# Patient Record
Sex: MALE | Race: WHITE | NOT HISPANIC OR LATINO | URBAN - METROPOLITAN AREA
[De-identification: names, ages, dates, MRNs, and addresses within clinical notes are randomized per-mention and may not be internally consistent; named-entity substitution may affect disease eponyms.]

---

## 2017-11-05 ENCOUNTER — EMERGENCY (EMERGENCY)
Facility: HOSPITAL | Age: 22
LOS: 1 days | Discharge: HOME | End: 2017-11-05
Attending: EMERGENCY MEDICINE | Admitting: EMERGENCY MEDICINE
Payer: SELF-PAY

## 2017-11-05 VITALS
RESPIRATION RATE: 16 BRPM | TEMPERATURE: 97 F | OXYGEN SATURATION: 96 % | HEART RATE: 96 BPM | SYSTOLIC BLOOD PRESSURE: 107 MMHG | DIASTOLIC BLOOD PRESSURE: 69 MMHG

## 2017-11-05 PROCEDURE — 99283 EMERGENCY DEPT VISIT LOW MDM: CPT | Mod: 25

## 2017-11-05 PROCEDURE — 12011 RPR F/E/E/N/L/M 2.5 CM/<: CPT

## 2017-11-05 PROCEDURE — 99053 MED SERV 10PM-8AM 24 HR FAC: CPT

## 2017-11-05 RX ORDER — TETANUS TOXOID, REDUCED DIPHTHERIA TOXOID AND ACELLULAR PERTUSSIS VACCINE, ADSORBED 5; 2.5; 8; 8; 2.5 [IU]/.5ML; [IU]/.5ML; UG/.5ML; UG/.5ML; UG/.5ML
0.5 SUSPENSION INTRAMUSCULAR ONCE
Qty: 0 | Refills: 0 | Status: DISCONTINUED | OUTPATIENT
Start: 2017-11-05 | End: 2017-11-11
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
## 2017-11-10 DIAGNOSIS — S01.112A LACERATION WITHOUT FOREIGN BODY OF LEFT EYELID AND PERIOCULAR AREA, INITIAL ENCOUNTER: ICD-10-CM
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
## 2018-08-13 NOTE — ED PROCEDURE NOTE - NS ED PROCEDURE ASSISTED BY
ED Attending  CC: Sona         Department of Emergency Medicine   ED  Provider Note  Admit Date/RoomTime: 8/12/2018  8:26 PM  ED Room: 13/13  Chief Complaint:   Foreign Body in Eye (left eye, possible piece of wood)    History of Present Illness   Source of history provided by:  patient. History/Exam Limitations: none. Shyann Monroe is a 54 y.o. old male presenting to the emergency department by private vehicle, with sudden onset foreign body sensation, itching, pain and tearing to left eye, which began around 1730 last night prior to arrival.  Since onset his symptoms have been persistent and moderate in severity. Associated signs & symptoms of:  none. The patients tetanus status is more than 5 years ago. Patient states that he was cutting wood without safety glasses when he felt a foreign body hit his left eye. Since then he has had pain, tearing, and an FB sensation. Does not wear glasses or contacts. Patient denies fever,Chills night sweats and shortness of breath chest pain abdominal pain nausea or vomiting. Patient states that he has been rubbing his eye for the last day before coming in and that it is very red. Patient denies any visual changes with this. Mechanism:     [x]  FB Exposure     []  Chemical Exposure     []  Direct Trauma     []  High Speed Machinery Use     []  Welding      Circumstances:    []  Contact Lens Use     []  Recent URI Sx's     []  Spontaneous Onset     []  Close Contact w/similar Sx's     []  Work Related     History of:     []   Glaucoma     []   Recent Eye Surgery     ROS    Pertinent positives and negatives are stated within HPI, all other systems reviewed and are negative. Past Surgical History:  has no past surgical history on file. Social History:  reports that he has never smoked. He does not have any smokeless tobacco history on file. He reports that he drinks alcohol. He reports that he does not use drugs.   Family History: family history is not on Radiologist unless otherwise noted. No orders to display       ED Course / Medical Decision Making     Medications   tetracaine (TETRAVISC) 0.5 % ophthalmic solution 2 drop (2 drops Ophthalmic Given 8/12/18 2126)   fluorescein ophthalmic strip 1 each (1 mg Left Eye Given 8/12/18 2122)   Tetanus-Diphth-Acell Pertussis (BOOSTRIX) injection 0.5 mL (0.5 mLs Intramuscular Given 8/12/18 2123)        Re-examination:  8/12/18           Patient was informed that we were unable to remove the foreign body with multiple attempts. Patient did have a vagal response while attempting to remove foreign body. Patient was given water and had vital signs rechecked. Patient was fine after this event. Patient is ambulatory in the emergency department with a stable gait. Patient was [facility] whose information was provided calling them in the morning to be seen in their office. He will be given naproxen for pain and erythromycin to prevent infection. He was instructed to return to the emergency department for any new or worsening symptoms prior to his ability to get into the [facility]. Patient is agreeable to this plan. Consult(s):   None    Procedure(s):     WOOD'S LAMP EXAM:  Performed By: LOBITO Bangura CNP and Dr. Yolanda Oropeza    Left Eye: Cornea: no corneal ulcer was observed, no limbal injection was noted and an abrasion is present which is approximately 1 mm at the 7 o'clock position. Patient has a less th 1 mm likely wood particle noted in the conjunctiva at the 7:00 aspect in the center of the abrasion that was unable to be removed with the above measures noted. Flourescein stain: Positive. Anterior chamber: no abnormalities were observed. MDM:   Patient presents to the emergency department today for a likely foreign body to the left eye that occurred after working with wood yesterday. Dr. Guerrero See and  myself at bedside for evaluation of this.  Patient does have The procedure was performed independently

## 2025-02-17 PROBLEM — Z00.00 ENCOUNTER FOR PREVENTIVE HEALTH EXAMINATION: Status: ACTIVE | Noted: 2025-02-17

## 2025-02-19 ENCOUNTER — APPOINTMENT (OUTPATIENT)
Dept: NEUROSURGERY | Facility: CLINIC | Age: 30
End: 2025-02-19

## 2025-02-19 DIAGNOSIS — Q28.8 OTHER SPECIFIED CONGENITAL MALFORMATIONS OF CIRCULATORY SYSTEM: ICD-10-CM

## 2025-02-19 PROBLEM — Z78.9 NO PERTINENT PAST MEDICAL HISTORY: Status: RESOLVED | Noted: 2025-02-19 | Resolved: 2025-02-19

## 2025-02-19 PROBLEM — Z78.9 SOCIAL ALCOHOL USE: Status: ACTIVE | Noted: 2025-02-19

## 2025-02-19 PROCEDURE — 99202 OFFICE O/P NEW SF 15 MIN: CPT | Mod: 93

## 2025-02-19 RX ORDER — FINASTERIDE 1 MG/1
TABLET ORAL
Refills: 0 | Status: ACTIVE | COMMUNITY

## 2025-02-20 ENCOUNTER — APPOINTMENT (OUTPATIENT)
Dept: NEUROSURGERY | Facility: CLINIC | Age: 30
End: 2025-02-20

## 2025-02-20 DIAGNOSIS — Z78.9 OTHER SPECIFIED HEALTH STATUS: ICD-10-CM

## 2025-05-12 NOTE — ED PROVIDER NOTE - PSH
Date of service: 5/13/25    REASON FOR VISIT  1. Prostate cancer  (CMD)         HISTORY OF PRESENT ILLNESS    The patient is a 77 year old male presents for follow up on elevated PSA of 139 from 1.79 and was seen by urologist  on 2/17/25. Repeat PSA was 160.48 on 2/17/25  Patient I planned to get prostate biopsy on 3/19/25.  Patient is getting bone scan on 3/14/25.  Patient has upper and lower extremity pain for 4 months and also lost 50 lb weight with poor appetite.   Currently on [place] [facility].    Had biopsy of prostate on 3/19/25.   Final Diagnosis    1. Prostate, right, core needle biopsy:  -- Prostatic adenocarcinoma, grade group 5 (Isreal score 5 + 4 = 9), involving 8 of 8 cores (approximately 90% of submitted tissue).  -- Percentage of pattern 4: 5-10%  -- Cribriform glands present.  -- Percentage of pattern 5: %  -- Perineural invasion present.     2. Prostate, left, core needle biopsy:  -- Prostatic adenocarcinoma, grade group 5 (Isreal score 5 + 5 = 10), involving 2 of 6 cores (approximately 5% of submitted tissue).  -- Percentage of pattern 5: %  -- Perineural invasion present.  -- Lymphovascular invasion present.     NGS Foundation Once HRD positive, MS stable, TMB-low     IMAGING:    CT HEAD 3/21/25  IMPRESSION:     1.  Findings consistent with osseous metastases involving the right greater  than left mandibular necks, without pathologic fracture.  2.  No intracranial metastases.    CT-CAP  3/21/25    IMPRESSION:      Extensive osseous metastatic disease throughout the osseous structures of  the chest, abdomen, and pelvis. Subacute right lateral rib fractures may be  pathological in nature.     Multiple abnormally enlarged mediastinal, left axillary, and abdominopelvic  lymph nodes, also concerning for metastatic disease.     Mild prostatomegaly with a 3.3 x 2.9 cm ill-defined area of hypoattenuation  which may represent patient's known primary malignancy with  possible  extension/involvement of the left seminal vesicle, not well assessed on the  current modality.     Mild bilateral adrenal gland thickening/nodularity, left greater than  right. Attention on follow-up imaging.    TREATMENT:    Bicalutamide 50mg po daily 3/25/25 to 4/27/25.  Lupron 45mg started on 4/8/25  Abiraterone 1000mg +Prednisone 5mg po daily  started 4/28/25  Palliative RT to skeletal mets s/p radiation to bilateral femurs with 20 Gy in 5 fractions between 04/28/25-05/02/25.         20 Dozier in 5 fractions to bilateral humerus     INTERVAL HISTORY:    Feels better after starting ADT. No new complaints. Performance status has improved to ECOG 1.    Allergies as of 05/13/2025    (No Known Allergies)     Current Outpatient Medications   Medication Sig Dispense Refill    docusate sodium-sennosides (SENOKOT S) 50-8.6 MG per tablet Take 1 tablet by mouth 2 times daily as needed for Constipation. 60 tablet 3    polyethylene glycol (MIRALAX) 17 GM/SCOOP powder Take 17 g by mouth daily as needed (constipation). Stir and dissolve powder in any 4 to 8 ounces of beverage, then drink. 510 g 3    abiraterone acetate (ZYTIGA) 250 MG Tab Take 4 tablets by mouth daily. Begin taking on April 28, 2025. 120 tablet 5    predniSONE (DELTASONE) 5 MG tablet Take 1 tablet by mouth daily. Begin taking on April 28, 2025. 30 tablet 5    dexAMETHasone (DECADRON) 4 MG tablet Take 1 tablet by mouth 2 times daily (with meals). (Patient not taking: Reported on 5/13/2025) 10 tablet 0    bicalutamide (CASODEX) 50 MG tablet Take 1 tablet by mouth daily. (Patient not taking: Reported on 5/13/2025) 30 tablet 1    HYDROcodone-acetaminophen (NORCO) 5-325 MG per tablet Take 1-2 tablets by mouth every 6 hours as needed for Pain. (Patient not taking: Reported on 5/13/2025)      empagliflozin (Jardiance) 10 MG tablet Take 1 tablet by mouth daily (before breakfast). 90 tablet 0    metoPROLOL succinate (Toprol XL) 25 MG 24 hr tablet Take 0.5  tablets by mouth daily. 45 tablet 0    sacubitril-valsartan (Entresto) 24-26 MG per tablet Take 1 tablet by mouth in the morning and 1 tablet in the evening. 180 tablet 0    ticagrelor (BRILINTA) 60 MG tablet Take 1 tablet by mouth in the morning and 1 tablet in the evening. 180 tablet 0    nitroGLYCERIN (NITROSTAT) 0.4 MG sublingual tablet 1 tablet under the tongue every 5 minutes for chest pain, if not resolved after 3 tablets call 911.. (Patient not taking: Reported on 4/8/2025) 25 tablet 0    atorvastatin (LIPITOR) 40 MG tablet Take 1 tablet by mouth daily. (Patient not taking: Reported on 5/13/2025) 90 tablet 1    acetaminophen (TYLENOL) 500 MG tablet Take 1,000 mg by mouth every 4 hours as needed for Pain. (Patient not taking: Reported on 5/13/2025)      fluticasone (FLONASE) 50 MCG/ACT nasal spray Spray 1 spray in each nostril daily. (Patient not taking: Reported on 5/13/2025) 16 g 0    CARBOXYMethylcellulose PF (REFRESH CELLUVISC) 1 % Gel ophthalmic gel Place 1 drop into both eyes 2 times daily as needed for Dry Eyes.      aspirin 81 MG EC tablet Take 1 tablet by mouth daily. (Patient not taking: Reported on 5/7/2025) 30 tablet 2    finasteride (PROSCAR) 5 MG tablet Take 5 mg by mouth daily.      tamsulosin (FLOMAX) 0.4 MG Cap Take 0.4 mg by mouth daily.       No current facility-administered medications for this visit.        PMS HISTORY    LBBB (left bundle branch block)                               Sleep apnea                                                   Myocardial infarction  (CMD)                                  Coronary artery disease                                       BPH (benign prostatic hyperplasia)                            Gastroesophageal reflux disease                                CORONARY STENT PLACEMENT                                      HERNIA REPAIR                                                 QUADRICEPS REPAIR                                            Social History      Tobacco Use    Smoking status: Former     Current packs/day: 0.00     Types: Cigarettes     Start date: 1960     Quit date: 1990     Years since quittin.0    Smokeless tobacco: Never   Substance Use Topics    Alcohol use: Never    Drug use: Not Currently     History reviewed. No pertinent family history.    REVIEW OF SYSTEMS  Alfonso Marie LPN  2025 11:14 AM  Signed  Ej Orantes is a 77 year old male here for  Chief Complaint   Patient presents with    Follow-up     Prostate cancer (CMD)         Denies latex allergy or sensitivity.    Medication verified and med list updated.  PCP and Pharmacy verified.    Social History     Tobacco Use   Smoking Status Former    Current packs/day: 0.00    Types: Cigarettes    Start date: 1960    Quit date: 1990    Years since quittin.0   Smokeless Tobacco Never     Advance Directives Filed: No    ECOG:   ECOG   ECOG Performance Status        Vitals:    There were no vitals taken for this visit.    These vital signs are:  Within defined parameters (Per Reference \"Defined Limits Hospital Outpatient Department (HOD)\")    Height: Yes, shoes off.  Ht Readings from Last 1 Encounters:   25 5' 2\" (1.575 m)     Weight:Yes, shoes off.  Wt Readings from Last 3 Encounters:   25 82.1 kg (181 lb)   25 78.7 kg (173 lb 6.4 oz)   04/10/25 75.3 kg (165 lb 14.3 oz)       BMI: There is no height or weight on file to calculate BMI.    REVIEW OF SYSTEMS  GENERAL:  Patient denies headache, fevers, chills, excessive fatigue, change in appetite, weight loss, dizziness, but complains of: night sweats  ALLERGIC/IMMUNOLOGIC: Verified allergies: Yes  EYES:  Patient denies significant visual difficulties, double vision, blurred vision  ENT/MOUTH: Patient denies problems with hearing, sore throat, sinus drainage, mouth sores  ENDOCRINE:  Patient denies diabetes, thyroid disease, hormone replacement, hot flashes  HEMATOLOGIC/LYMPHATIC: Patient denies  No significant past surgical history bleeding, tender lymph nodes, swollen lymph nodes, but complains of: easy bruising  BREASTS: Patient denies abnormal masses of breast, nipple discharge, pain  RESPIRATORY:  Patient denies lung pain with breathing, cough, coughing up blood, but complains of: shortness of breath  CARDIOVASCULAR:  Patient denies anginal chest pain, palpitations, shortness of breath when lying flat, peripheral edema  GASTROINTESTINAL: Patient denies abdominal pain , nausea, vomiting, diarrhea, GI bleeding, constipation, change in bowel habits, heartburn, sensation of feeling full, difficulty swallowing  : Patient denies blood in the urine, burning with urination, frequency, urgency, hesitancy, incontinence  MUSCULOSKELETAL:  Patient denies joint pain, bone pain, joint swelling, redness, decreased range of motion  SKIN:  Patient denies chronic rashes, inflammation, ulcerations, skin changes, itching  NEUROLOGIC:  Patient denies loss of balance, areas of focal weakness, abnormal gait, sensory problems, numbness, tingling  PSYCHIATRIC: Patient denies insomnia, depression, anxiety    This patient reported abnormal symptoms that needed immediate verbal communication: No         PHYSICAL EXAM  General: The patient is a  77 year old male who is alert, oriented times 3, in no apparent distress.  Vitals: :Visit Vitals  /70   Pulse (!) 54   Temp 98.1 °F (36.7 °C)   Resp 17   Wt 83.7 kg (184 lb 9.6 oz)   SpO2 99%   BMI 33.76 kg/m²     ECOG 1  HEENT:  Pupils equally round, reactive to light with extraocular movements intact.  Anicteric sclerae with no oral lesions.  Neck:  Supple with no cervical or supraclavicular lymphadenopathy.  No jugular venous distention or carotid bruit.  There is no thyromegaly.   Lungs:  Clear to auscultation bilaterally.  There is no evidence of wheezing or rales on auscultation.   Cardiovascular:  Regular rate and rhythm.    Back:  There is no reproducible spinal tenderness.  Abdomen:  Soft, nontender, no  hepatosplenomegaly.  Bowel sounds are normal.     Extremities: No cyanosis, clubbing or edema.  Lymphatics: There is no cervical, supraclavicular, axillary lymphadenopathy.    Skin: No bruises or petechiae seen.  Neurologic: no focal deficits. 5/5 muscle strength in all 4 extremities.     LABS  Lab Results   Component Value Date    WBC 6.4 04/08/2025    HGB 11.6 (L) 04/08/2025    HCT 37.3 (L) 04/08/2025     04/08/2025    GLUCOSE 97 04/08/2025    CREATININE 0.91 04/08/2025    GPT 30 04/08/2025        DIAGNOSTIC STUDIES  CARDIAC DEVICE CHECK - IN CLINIC    Result Date: 2/27/2025  Table formatting from the original result was not included. Comments: The device was programmed to check thresholds and assess underlying rhythm. Interpretation: The physician personally reviewed and agree with the device interrogation as documented. Implants   ICD  Defibrillator Hebron Heart Failure Bi-V Icd - [id number] - Implanted   (Left) Chest  Inventory item: DFBR GALLANT HF DF4 IS-4 IS-1 CNCT [id number] 34CC 76GM Model/Cat number: [id number]  Serial number: [id number] : [employer] Cardiac Arrhythmias  Device identifier: [id number] Device identifier type: GS1  Implant Tracking Number: [id number] Pocket Location: Submuscular  Chamber(s) Paced: RA/RV/LV Laterality: Left  Site: Chest Implanted by: Bhargav Zuniga MD  Time of implant: 11:20 AM Date of implant: 7/12/2022  GUDID Information   Request status Successful    Brand name: Hebron™ Version/Model: [id number]  Company name: [employer] MRI safety info as of 7/12/22: MR Conditional  Contains dry or latex rubber: No    GMDN P.T. name: Cardiac resynchronization therapy implantable defibrillator      As of 2/25/2025   Status: Implanted Presenting Rhythm: AS/  Battery Voltage (V): 2.95 Voltage Comment: 4.2 years  Charge Time (Sec): 8.8 Mode: DDDR  Lower Rate (bpm): 60 Upper Rate (bpm): 120  Mode Switch % / AF Hundred: 1 Mode Switch Comment: 6 AMS  V Tach  Monitor Zone: 150 V Tach Detect Rate: 187  V Tach Therapies: ATP x3, 36.0J, 40.0J, 40.0J x2 V Fib Detect Rate: 222  V Fib Therapies: ATP x1, 36.0J, 40.0J, 40.0J x4 Interpretation Comments: Ventricular tachyarrhythmia episodes (2) Magnet response Changes: Atrial trigger type from AMS to AT/AF detection  Verification Comments: Implant verified via in office interrogation Julianna Chairez 2/27/25 9:04 AM AS-VS %: 1.2  AS- %: 84 AP-VS %: 1  AP- %: 15 BiV Paced %: 99  Atrial Paced %: 14        Lead  Lead Dfbr Optisure 8fr 58cm Endocardium 1 Coil Df4 - [id number] - Implanted   (Left) Ventricle  Inventory item: LEAD DFBR OPTISURE 58CM 1 COIL DF4 CNCT TRUE BP SENSE HLX Model/Cat number: [id number]  Serial number: [id number] : [employer]iac Arrhythmias  Device identifier: [id number] Device identifier type: GS1  Implant Tracking Number: [id number] Site: Ventricle  Laterality: Left Implanted by: Bhargav Zuniga MD  Time of implant: 10:48 AM Date of implant: 7/12/2022  GUDID Information   Request status Successful    Brand name: OPTISURE™ Version/Model: [id number]-59  Company name: [employer] MRI safety info as of 7/12/22: MR Conditional  Contains dry or latex rubber: No    GMDN P.T. name: Endocardial defibrillation lead      As of 2/25/2025   Status: Implanted Lead Location: RV  Threshold (V): 0.625 Threshold @ (ms): 0.5  Pacing Impedance (ohms): 460 Verification Comments: Implant verified via in office interrogation Julianna Chairez 2/27/25 9:10 AM  RV Coil Impedance (ohms): 68        Lead Quartet Lv 1458q-86cm - [id number] - Implanted   (Left) Ventricle  Inventory item: LEAD PCNG QUARTET 86CM 4 ELECTRODE IS4 CNCT STRBL TIP Model/Cat number: [id number]  Serial number: [id number] : [employer]iac [employer]rhythmias  Device identifier: [id number] Device identifier type: GS1  Implant Tracking Number: [id number] Site: Ventricle  Laterality: Left Implanted by: Bhargav Zuniga MD  Time  of implant: 11:06 AM Date of implant: 7/12/2022  VBI VaccinesDID Information   Request status Successful    Brand name: Quartet™ Version/Model: 1458Q-86  Company name: [employer] MRI safety info as of 7/12/22: MR Conditional  Contains dry or latex rubber: No    GMDN P.T. name: Endocardial pacing lead      As of 2/25/2025   Status: Implanted Lead Location: LV  Threshold (V): 0.75 Threshold @ (ms): 0.8  Pacing Impedance (ohms): 530 Verification Comments: Implant verified via in office interrogation Antoine Chairez. 2/27/25 9:09 AM      Lead Pcng Tendril Sts 6fr 46cm Hlx Endocardium - [id number] - Implanted   (Right) Atrium  Inventory item: LEAD PCNG TENDRIL STS 46CM BP ACTFX XTD IS 1 6FR HLX Model/Cat number: [id number]  Serial number: [id number] : [employer]iac [employer]rhythmias  Device identifier: [id number] Device identifier type: GS1  Implant Tracking Number: [id number] Site: Atrium  Laterality: Right Implanted by: Bhargav Zuniga MD  Time of implant: 11:12 AM Date of implant: 7/12/2022  VBI VaccinesDID Information   Request status Successful    Brand name: Tendril™ Version/Model: STS  Company name: [employer] MRI safety info as of 7/12/22: MR Conditional  Contains dry or latex rubber: No    GMDN P.T. name: Endocardial pacing lead      As of 2/25/2025   Status: Implanted Lead Location: RA  Threshold (V): 0.5 Threshold @ (ms): 0.5  Pacing Impedance (ohms): 390 Verification Comments: Implant verified via in office interrogation Antoine Chairez. 2/27/25 9:10 AM  Sensing Amplitude (mV): 2.8                The above history, physical exam, lab/path, and imaging findings have been reviewed and updated where applicable in order to derive my Hem/Onc assessment and recommendations which, as listed below, were discussed with the patient/family in details.    ASSESSMENT  1. Prostate cancer  (CMD)        Stage IV castration sensitive Prostatic adenocarcinoma with extensive bone mets and  lymphadenopathy.     Now on Abiraterone 1000mg +Prednisone 5mg po daily since 4/28/25 and was on.  Bicalutamide 50mg po daily 3/25/25 to 4/27/25 and Lupron 45mg started on 4/8/25    Performance status has improved to ECOG 1.    PLAN      Discussed with patient the natural history, course and prognosis of Stage IV prostate cancer.    NCCN guidelines was reviewed with the patient and different treatment strategies were discussed including the benefits and risks of ADT+Darolutamide+Docetaxel per ARASENS trial or ADT+Abiraterone+Docetaxel per PEACE-1 trial.  .  Adverse effects of chemotherapy including but not limited to neutropenia with sepsis and death, anemia fatigue, thrombocytopenia with bleeding complications, weight loss, nausea, vomiting, diarrhea, abdominal pain, constipation, alopecia, damage to heart, lungs, liver, kidneys, neuropathy, blood clots and even death were discussed in detail.     Given patients age, and comorbidities and anticipated poor tolerance to triplet therapy option of ADT plus either Abiraterone/Apalutamide or Enzalutamide was discussed.     After extensive discussion patient consented to proceed with systemic therapy with combined ADT and [facility] opted for Abiraterone with Prednisone.    Abiraterone 1000mg +Prednisone 5mg po daily to start 4/28/25. Discontinue Bicalutamide on 4/27/25.    Is the patient on an oral anti-cancer agent?  Yes, Discussed oral chemotherapy adherence and side effects with patient.  Patient is taking medication as prescribed.    Lupron 45mg q 6 months started 4/8/25 with next dose on 9/23/25.    RTC with MD 6/9/25 with labs 5 days prior    Continue follow up with palliative care for pain management.    Follow up with radiation oncology for palliative RT to painful bone mets.     somatic NGS HRD+ and MS-stable   referral to genetics for germline testing pending    Patient will follow-up with PCP regarding other comorbidities.    All questions and concerns  of the patient was answered in detail and patient is in complete agreement with plan of care.    Total time I spent today on this appointment was 40 minutes, including preparing to see the patient by reviewing prior records, obtaining and reviewing history, performing a physical exam, counseling the patient, documenting clinical information in the medical record, ordering medications/tests/procedures, and coordinating care.